# Patient Record
Sex: MALE | Race: WHITE | NOT HISPANIC OR LATINO | Employment: FULL TIME | URBAN - METROPOLITAN AREA
[De-identification: names, ages, dates, MRNs, and addresses within clinical notes are randomized per-mention and may not be internally consistent; named-entity substitution may affect disease eponyms.]

---

## 2017-01-09 ENCOUNTER — APPOINTMENT (OUTPATIENT)
Dept: BEHAVIORAL HEALTH | Facility: PHYSICIAN GROUP | Age: 22
End: 2017-01-09
Payer: OTHER GOVERNMENT

## 2019-03-04 ENCOUNTER — HOSPITAL ENCOUNTER (EMERGENCY)
Facility: MEDICAL CENTER | Age: 24
End: 2019-03-04
Attending: EMERGENCY MEDICINE
Payer: COMMERCIAL

## 2019-03-04 VITALS
RESPIRATION RATE: 14 BRPM | TEMPERATURE: 96.8 F | BODY MASS INDEX: 24.92 KG/M2 | OXYGEN SATURATION: 95 % | HEART RATE: 61 BPM | DIASTOLIC BLOOD PRESSURE: 63 MMHG | WEIGHT: 178 LBS | HEIGHT: 71 IN | SYSTOLIC BLOOD PRESSURE: 112 MMHG

## 2019-03-04 DIAGNOSIS — R19.5 DARK STOOLS: ICD-10-CM

## 2019-03-04 DIAGNOSIS — R82.998 DARK URINE: ICD-10-CM

## 2019-03-04 LAB
APPEARANCE UR: CLEAR
BILIRUB UR QL STRIP.AUTO: NEGATIVE
COLOR UR: YELLOW
GLUCOSE UR STRIP.AUTO-MCNC: NEGATIVE MG/DL
KETONES UR STRIP.AUTO-MCNC: NEGATIVE MG/DL
LEUKOCYTE ESTERASE UR QL STRIP.AUTO: NEGATIVE
MICRO URNS: NORMAL
NITRITE UR QL STRIP.AUTO: NEGATIVE
PH UR STRIP.AUTO: 6.5 [PH]
PROT UR QL STRIP: NEGATIVE MG/DL
RBC UR QL AUTO: NEGATIVE
SP GR UR STRIP.AUTO: 1
UROBILINOGEN UR STRIP.AUTO-MCNC: 0.2 MG/DL

## 2019-03-04 PROCEDURE — 81003 URINALYSIS AUTO W/O SCOPE: CPT

## 2019-03-04 PROCEDURE — 99284 EMERGENCY DEPT VISIT MOD MDM: CPT

## 2019-03-04 PROCEDURE — 82272 OCCULT BLD FECES 1-3 TESTS: CPT

## 2019-03-04 NOTE — ED NOTES
Pt ambulates to room without incidence with urine sample. Pt states he had orange colored urine this morning and his stool had a purple-latonia color to it this morning. Pt states he drank 3 liters of water this morning to try to pee more and is now experiencing abdominal fullness. Pt denies taking multivitamins or being treated with antibiotics. Informs staff he ate a lot of beets 2 days ago.

## 2019-03-04 NOTE — ED NOTES
Pt given discharge and follow up instructions, all questions answered, pt verbalized understanding. Pt discharged to self. Copy of discharge provided to pt. Signed copy in chart.  Pt states that all personal belongings are in possession. Pt ambulates off of unit without incidence.

## 2019-03-04 NOTE — ED TRIAGE NOTES
Chief Complaint   Patient presents with   • Difficulty Urinating     reports difficulty urinating and feeling like he isn't voiding enough x1 day. also reports his urine has been orange for 1 day.    • Bloody Stools     one time this morning.     Pt to triage for above. Pt denies pain. NAD noted. VSS.

## 2019-03-04 NOTE — DISCHARGE INSTRUCTIONS
Your stool blood test was interpreted as possibly having a small amount of blood. Return to the ER for any blood in stool or any other concerns. Follow-up with primary care.
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
## 2020-09-17 ENCOUNTER — TELEPHONE (OUTPATIENT)
Dept: SCHEDULING | Facility: IMAGING CENTER | Age: 25
End: 2020-09-17

## 2020-10-09 ENCOUNTER — OFFICE VISIT (OUTPATIENT)
Dept: URGENT CARE | Facility: CLINIC | Age: 25
End: 2020-10-09
Payer: COMMERCIAL

## 2020-10-09 VITALS
HEIGHT: 72 IN | SYSTOLIC BLOOD PRESSURE: 120 MMHG | DIASTOLIC BLOOD PRESSURE: 76 MMHG | WEIGHT: 180 LBS | HEART RATE: 65 BPM | TEMPERATURE: 98.1 F | BODY MASS INDEX: 24.38 KG/M2 | OXYGEN SATURATION: 97 %

## 2020-10-09 DIAGNOSIS — R59.1 LYMPHADENOPATHY: ICD-10-CM

## 2020-10-09 LAB
HETEROPH AB SER QL LA: NEGATIVE
INT CON NEG: NORMAL
INT CON POS: NORMAL

## 2020-10-09 PROCEDURE — 86308 HETEROPHILE ANTIBODY SCREEN: CPT | Performed by: PHYSICIAN ASSISTANT

## 2020-10-09 PROCEDURE — 99204 OFFICE O/P NEW MOD 45 MIN: CPT | Performed by: PHYSICIAN ASSISTANT

## 2020-10-09 ASSESSMENT — ENCOUNTER SYMPTOMS
SORE THROAT: 0
SHORTNESS OF BREATH: 0
PALPITATIONS: 0
FEVER: 0
COUGH: 0
MYALGIAS: 0

## 2020-10-09 NOTE — PROGRESS NOTES
Subjective:   Remberto Dawson is a 25 y.o. male who presents for Pharyngitis (x1 month swollen lymphondes, no pain)      Patient is a 25-year-old male who presents for evaluation of lymphadenopathy.  He has had swollen lymph nodes of his neck for over a month.  He denies any URI symptoms, fevers, loss of weight or night sweats.      Review of Systems   Constitutional: Negative for fever and malaise/fatigue.   HENT: Negative for sore throat.         Lymphadenopathy   Respiratory: Negative for cough and shortness of breath.    Cardiovascular: Negative for chest pain and palpitations.   Musculoskeletal: Negative for joint pain and myalgias.   Skin: Negative for itching and rash.   All other systems reviewed and are negative.      Medications:    • This patient does not have an active medication from one of the medication groupers.    Allergies: Patient has no known allergies.    Problem List: Remberto Dawson does not have a problem list on file.    Surgical History:  No past surgical history on file.    Past Social Hx: Remberto Dawson  reports that he has quit smoking. He has never used smokeless tobacco. He reports current alcohol use. He reports that he does not use drugs.     Past Family Hx:  Remberto Dawson family history includes Alcohol abuse in his mother.     Problem list, medications, and allergies reviewed by myself today in Epic.     Objective:     Blood Pressure 120/76   Pulse 65   Temperature 36.7 °C (98.1 °F) (Temporal)   Height 1.829 m (6')   Weight 81.6 kg (180 lb)   Oxygen Saturation 97%   Body Mass Index 24.41 kg/m²     Physical Exam  Vitals signs reviewed.   Constitutional:       General: He is not in acute distress.     Appearance: Normal appearance. He is not ill-appearing, toxic-appearing or diaphoretic.   HENT:      Head: Normocephalic and atraumatic.   Neck:      Musculoskeletal: Normal range of motion and neck supple. No neck rigidity or muscular tenderness.      Vascular: No carotid bruit.       "Comments: No focal midline tenderness of the cervical spine.  No step-offs appreciated.  Flexion, extension, lateral bend and rotation is limited due to pain.  Cardiovascular:      Rate and Rhythm: Normal rate.      Heart sounds: Normal heart sounds.   Pulmonary:      Effort: Pulmonary effort is normal.   Musculoskeletal: Normal range of motion.   Lymphadenopathy:      Cervical: Cervical adenopathy present.   Skin:     General: Skin is warm.      Findings: No rash.   Neurological:      General: No focal deficit present.      Mental Status: He is alert and oriented to person, place, and time. Mental status is at baseline.      Cranial Nerves: No cranial nerve deficit.      Sensory: No sensory deficit.      Motor: No weakness.      Coordination: Coordination normal.      Gait: Gait normal.      Deep Tendon Reflexes: Reflexes normal.      Comments: Motor and sensory of C-Spine    Deltoid/biceps brachii(C5): Intact  Radial wrist extensor(C6): Intact  Triceps brachii/flexor carpi radialis(C7): Intact  Flexor Digitorum Superficialis (C8): Intact    Special Test    Brudzinski-Kercruzg: N/A  Spurling: N/A   Psychiatric:         Mood and Affect: Mood normal.         Behavior: Behavior normal.         Thought Content: Thought content normal.         Judgment: Judgment normal.         Assessment/Plan:     Medical Decision Making/Comments   Pt is a 25 yr old male who presents for evaluation of a tender enlarged lymph node.  Pt states he has had these swollen nodes for 1 month .  The node has been unchanged since onset.  Pt denies recent URI.  Patient denies \"B\" symptoms such as night sweats, fevers, weight loss, hepatomegaly.  No chronic cough or exposure to TB or kittens.  Vital signs normal.  Pt appears well and in no acute distress.  There is a firm <1 cm mobile lymph node located at the post cervical region.  The node is not red or warm to the touch.  No swollen nodes are appreciated at the supraclavicular region.  No " Kawasaki symptoms. Mono test is negative.  Due to a month of symptoms will obtain cbc and ultrasound.  Referral sent for follow up with PCP    Diagnosis differential includes but not limited to:    Reactive  Lymphadenitis  Atypical Infection  Malignancy       Diagnosis and associated orders     1. Lymphadenopathy  POCT Mononucleosis (mono)    CBC WITH DIFFERENTIAL    MONONUCLEOSIS TEST QUAL    US-SOFT TISSUES OF HEAD - NECK    REFERRAL TO FOLLOW-UP WITH PRIMARY CARE              Differential diagnosis, natural history, supportive care, and indications for immediate follow-up discussed.    Advised the patient to follow-up with the primary care physician for recheck, reevaluation, and consideration of further management.    Please note that this dictation was created using voice recognition software. I have made a reasonable attempt to correct obvious errors, but I expect that there are errors of grammar and possibly content that I did not discover before finalizing the note.